# Patient Record
Sex: FEMALE | Race: WHITE | NOT HISPANIC OR LATINO | RURAL
[De-identification: names, ages, dates, MRNs, and addresses within clinical notes are randomized per-mention and may not be internally consistent; named-entity substitution may affect disease eponyms.]

---

## 2020-08-17 ENCOUNTER — HISTORICAL (OUTPATIENT)
Dept: ADMINISTRATIVE | Facility: HOSPITAL | Age: 35
End: 2020-08-17

## 2020-08-17 LAB — SARS-COV+SARS-COV-2 AG RESP QL IA.RAPID: POSITIVE

## 2022-12-04 ENCOUNTER — OFFICE VISIT (OUTPATIENT)
Dept: FAMILY MEDICINE | Facility: CLINIC | Age: 37
End: 2022-12-04
Payer: COMMERCIAL

## 2022-12-04 VITALS
BODY MASS INDEX: 35.99 KG/M2 | SYSTOLIC BLOOD PRESSURE: 136 MMHG | TEMPERATURE: 99 F | OXYGEN SATURATION: 95 % | DIASTOLIC BLOOD PRESSURE: 89 MMHG | WEIGHT: 216 LBS | HEART RATE: 110 BPM | HEIGHT: 65 IN

## 2022-12-04 DIAGNOSIS — J03.00 STREP TONSILLITIS: Primary | ICD-10-CM

## 2022-12-04 DIAGNOSIS — Z20.828 VIRAL DISEASE EXPOSURE: ICD-10-CM

## 2022-12-04 PROBLEM — J32.9 CHRONIC SINUSITIS: Status: ACTIVE | Noted: 2022-12-04

## 2022-12-04 PROBLEM — E11.9 TYPE 2 DIABETES MELLITUS WITHOUT COMPLICATION: Status: ACTIVE | Noted: 2022-12-04

## 2022-12-04 PROBLEM — E66.01 MORBIDLY OBESE: Status: ACTIVE | Noted: 2022-12-04

## 2022-12-04 PROBLEM — Z79.899 OTHER LONG TERM (CURRENT) DRUG THERAPY: Status: ACTIVE | Noted: 2022-12-04

## 2022-12-04 PROBLEM — E78.2 MIXED HYPERLIPIDEMIA: Status: ACTIVE | Noted: 2022-12-04

## 2022-12-04 LAB
CTP QC/QA: YES
FLUAV AG NPH QL: NEGATIVE
FLUBV AG NPH QL: NEGATIVE
S PYO RRNA THROAT QL PROBE: POSITIVE
SARS-COV-2 AG RESP QL IA.RAPID: NEGATIVE

## 2022-12-04 PROCEDURE — 3075F PR MOST RECENT SYSTOLIC BLOOD PRESS GE 130-139MM HG: ICD-10-PCS | Mod: ,,, | Performed by: NURSE PRACTITIONER

## 2022-12-04 PROCEDURE — 1160F RVW MEDS BY RX/DR IN RCRD: CPT | Mod: ,,, | Performed by: NURSE PRACTITIONER

## 2022-12-04 PROCEDURE — 99051 MED SERV EVE/WKEND/HOLIDAY: CPT | Mod: ,,, | Performed by: NURSE PRACTITIONER

## 2022-12-04 PROCEDURE — 3075F SYST BP GE 130 - 139MM HG: CPT | Mod: ,,, | Performed by: NURSE PRACTITIONER

## 2022-12-04 PROCEDURE — 99204 PR OFFICE/OUTPT VISIT, NEW, LEVL IV, 45-59 MIN: ICD-10-PCS | Mod: 25,,, | Performed by: NURSE PRACTITIONER

## 2022-12-04 PROCEDURE — 87804 INFLUENZA ASSAY W/OPTIC: CPT | Mod: QW,,, | Performed by: NURSE PRACTITIONER

## 2022-12-04 PROCEDURE — 3079F DIAST BP 80-89 MM HG: CPT | Mod: ,,, | Performed by: NURSE PRACTITIONER

## 2022-12-04 PROCEDURE — 3008F BODY MASS INDEX DOCD: CPT | Mod: ,,, | Performed by: NURSE PRACTITIONER

## 2022-12-04 PROCEDURE — 96372 THER/PROPH/DIAG INJ SC/IM: CPT | Mod: ,,, | Performed by: NURSE PRACTITIONER

## 2022-12-04 PROCEDURE — 1159F MED LIST DOCD IN RCRD: CPT | Mod: ,,, | Performed by: NURSE PRACTITIONER

## 2022-12-04 PROCEDURE — 3079F PR MOST RECENT DIASTOLIC BLOOD PRESSURE 80-89 MM HG: ICD-10-PCS | Mod: ,,, | Performed by: NURSE PRACTITIONER

## 2022-12-04 PROCEDURE — 87426 SARS CORONAVIRUS 2 ANTIGEN POCT: ICD-10-PCS | Mod: QW,,, | Performed by: NURSE PRACTITIONER

## 2022-12-04 PROCEDURE — 87804 POCT INFLUENZA A/B: ICD-10-PCS | Mod: 59,QW,, | Performed by: NURSE PRACTITIONER

## 2022-12-04 PROCEDURE — 87880 STREP A ASSAY W/OPTIC: CPT | Mod: QW,,, | Performed by: NURSE PRACTITIONER

## 2022-12-04 PROCEDURE — 87880 POCT RAPID STREP A: ICD-10-PCS | Mod: QW,,, | Performed by: NURSE PRACTITIONER

## 2022-12-04 PROCEDURE — 1159F PR MEDICATION LIST DOCUMENTED IN MEDICAL RECORD: ICD-10-PCS | Mod: ,,, | Performed by: NURSE PRACTITIONER

## 2022-12-04 PROCEDURE — 99051 PR MEDICAL SERVICES, EVE/WKEND/HOLIDAY: ICD-10-PCS | Mod: ,,, | Performed by: NURSE PRACTITIONER

## 2022-12-04 PROCEDURE — 1160F PR REVIEW ALL MEDS BY PRESCRIBER/CLIN PHARMACIST DOCUMENTED: ICD-10-PCS | Mod: ,,, | Performed by: NURSE PRACTITIONER

## 2022-12-04 PROCEDURE — 87426 SARSCOV CORONAVIRUS AG IA: CPT | Mod: QW,,, | Performed by: NURSE PRACTITIONER

## 2022-12-04 PROCEDURE — 99204 OFFICE O/P NEW MOD 45 MIN: CPT | Mod: 25,,, | Performed by: NURSE PRACTITIONER

## 2022-12-04 PROCEDURE — 96372 PR INJECTION,THERAP/PROPH/DIAG2ST, IM OR SUBCUT: ICD-10-PCS | Mod: ,,, | Performed by: NURSE PRACTITIONER

## 2022-12-04 PROCEDURE — 3008F PR BODY MASS INDEX (BMI) DOCUMENTED: ICD-10-PCS | Mod: ,,, | Performed by: NURSE PRACTITIONER

## 2022-12-04 RX ORDER — DEXAMETHASONE SODIUM PHOSPHATE 4 MG/ML
4 INJECTION, SOLUTION INTRA-ARTICULAR; INTRALESIONAL; INTRAMUSCULAR; INTRAVENOUS; SOFT TISSUE
Status: COMPLETED | OUTPATIENT
Start: 2022-12-04 | End: 2022-12-04

## 2022-12-04 RX ORDER — METFORMIN HYDROCHLORIDE 500 MG/1
500 TABLET ORAL 2 TIMES DAILY
COMMUNITY
Start: 2022-11-30

## 2022-12-04 RX ORDER — ROSUVASTATIN CALCIUM 10 MG/1
10 TABLET, COATED ORAL NIGHTLY
COMMUNITY
Start: 2022-09-21

## 2022-12-04 RX ORDER — AZITHROMYCIN 500 MG/1
500 TABLET, FILM COATED ORAL DAILY
Qty: 5 TABLET | Refills: 0 | Status: SHIPPED | OUTPATIENT
Start: 2022-12-04 | End: 2022-12-09

## 2022-12-04 RX ORDER — GLIMEPIRIDE 2 MG/1
2 TABLET ORAL 2 TIMES DAILY
COMMUNITY
Start: 2022-11-30

## 2022-12-04 RX ORDER — SEMAGLUTIDE 1.34 MG/ML
INJECTION, SOLUTION SUBCUTANEOUS
COMMUNITY
Start: 2022-06-30

## 2022-12-04 RX ORDER — CEFTRIAXONE 1 G/1
1 INJECTION, POWDER, FOR SOLUTION INTRAMUSCULAR; INTRAVENOUS
Status: COMPLETED | OUTPATIENT
Start: 2022-12-04 | End: 2022-12-04

## 2022-12-04 RX ADMIN — DEXAMETHASONE SODIUM PHOSPHATE 4 MG: 4 INJECTION, SOLUTION INTRA-ARTICULAR; INTRALESIONAL; INTRAMUSCULAR; INTRAVENOUS; SOFT TISSUE at 10:12

## 2022-12-04 RX ADMIN — CEFTRIAXONE 1 G: 1 INJECTION, POWDER, FOR SOLUTION INTRAMUSCULAR; INTRAVENOUS at 10:12

## 2022-12-04 NOTE — PROGRESS NOTES
"Subjective:       Patient ID: Betty Ortiz is a 37 y.o. female.    Chief Complaint: Sore Throat and Otalgia (Symptoms started 12/03/22. Pain in both ears)    Presents to clinic as above. No fever. She is a .    Review of Systems   Constitutional:  Positive for malaise/fatigue. Negative for chills and fever.   HENT:  Positive for ear pain and sore throat. Negative for ear discharge and sinus pain.    Respiratory: Negative.     Cardiovascular: Negative.    Musculoskeletal: Negative.    Neurological:  Positive for headaches.        Reviewed family, medical, surgical, and social history.    Objective:      /89   Pulse 110   Temp 99.2 °F (37.3 °C)   Ht 5' 5" (1.651 m)   Wt 98 kg (216 lb)   LMP 11/18/2022   SpO2 95%   BMI 35.94 kg/m²   Physical Exam  Vitals and nursing note reviewed.   Constitutional:       General: She is not in acute distress.     Appearance: Normal appearance. She is not ill-appearing, toxic-appearing or diaphoretic.   HENT:      Head: Normocephalic.      Right Ear: Tympanic membrane, ear canal and external ear normal.      Left Ear: Tympanic membrane, ear canal and external ear normal.      Nose: Nose normal. No congestion or rhinorrhea.      Mouth/Throat:      Mouth: Mucous membranes are moist.      Pharynx: Uvula midline. Pharyngeal swelling and posterior oropharyngeal erythema present. No oropharyngeal exudate or uvula swelling.      Tonsils: Tonsillar exudate present. No tonsillar abscesses. 3+ on the right. 3+ on the left.      Comments: Posterior pharynx bright red with petechiae  Cardiovascular:      Rate and Rhythm: Normal rate and regular rhythm.      Heart sounds: Normal heart sounds.   Pulmonary:      Effort: Pulmonary effort is normal.      Breath sounds: Normal breath sounds.   Musculoskeletal:      Cervical back: Normal range of motion and neck supple.   Skin:     General: Skin is warm and dry.      Capillary Refill: Capillary refill takes less than 2 seconds. "   Neurological:      Mental Status: She is alert and oriented to person, place, and time.   Psychiatric:         Mood and Affect: Mood normal.         Behavior: Behavior normal.         Thought Content: Thought content normal.         Judgment: Judgment normal.          Office Visit on 12/04/2022   Component Date Value Ref Range Status    Rapid Strep A Screen 12/04/2022 Positive (A)  Negative Final     Acceptable 12/04/2022 Yes   Final    Rapid Influenza A Ag 12/04/2022 Negative  Negative Final    Rapid Influenza B Ag 12/04/2022 Negative  Negative Final     Acceptable 12/04/2022 Yes   Final    SARS Coronavirus 2 Antigen 12/04/2022 Negative  Negative Final     Acceptable 12/04/2022 Yes   Final      Assessment:       1. Strep tonsillitis    2. Viral disease exposure          Plan:       Strep tonsillitis  -     cefTRIAXone injection 1 g  -     dexAMETHasone injection 4 mg  -     azithromycin (ZITHROMAX) 500 MG tablet; Take 1 tablet (500 mg total) by mouth once daily. for 5 days  Dispense: 5 tablet; Refill: 0    Viral disease exposure  -     POCT rapid strep A  -     POCT Influenza A/B  -     SARS Coronavirus 2 Antigen, POCT  Drink plenty of fluids  RTC PRN          Risks, benefits, and side effects were discussed with the patient. All questions were answered to the fullest satisfaction of the patient, and pt verbalized understanding and agreement to treatment plan. Pt was to call with any new or worsening symptoms, or present to the ER.

## 2024-07-11 ENCOUNTER — TELEPHONE (OUTPATIENT)
Dept: OBSTETRICS AND GYNECOLOGY | Facility: CLINIC | Age: 39
End: 2024-07-11
Payer: COMMERCIAL

## 2024-07-11 ENCOUNTER — OFFICE VISIT (OUTPATIENT)
Dept: OBSTETRICS AND GYNECOLOGY | Facility: CLINIC | Age: 39
End: 2024-07-11
Payer: COMMERCIAL

## 2024-07-11 VITALS
SYSTOLIC BLOOD PRESSURE: 124 MMHG | OXYGEN SATURATION: 99 % | RESPIRATION RATE: 17 BRPM | HEIGHT: 65 IN | DIASTOLIC BLOOD PRESSURE: 84 MMHG | BODY MASS INDEX: 35.65 KG/M2 | WEIGHT: 214 LBS | HEART RATE: 76 BPM

## 2024-07-11 DIAGNOSIS — Z86.39 HISTORY OF TYPE 2 DIABETES MELLITUS: ICD-10-CM

## 2024-07-11 DIAGNOSIS — Z34.90 EARLY STAGE OF PREGNANCY: Primary | ICD-10-CM

## 2024-07-11 DIAGNOSIS — Z32.01 POSITIVE URINE PREGNANCY TEST: ICD-10-CM

## 2024-07-11 DIAGNOSIS — Z34.91 FIRST TRIMESTER PREGNANCY: ICD-10-CM

## 2024-07-11 DIAGNOSIS — E66.9 OBESITY (BMI 30-39.9): ICD-10-CM

## 2024-07-11 DIAGNOSIS — N91.2 AMENORRHEA: Primary | ICD-10-CM

## 2024-07-11 DIAGNOSIS — O09.511 ADVANCED MATERNAL AGE, 1ST PREGNANCY, FIRST TRIMESTER: ICD-10-CM

## 2024-07-11 LAB
B-HCG UR QL: POSITIVE
CTP QC/QA: YES
HCG SERPL-ACNC: NORMAL MIU/ML

## 2024-07-11 PROCEDURE — 84702 CHORIONIC GONADOTROPIN TEST: CPT | Mod: ,,, | Performed by: CLINICAL MEDICAL LABORATORY

## 2024-07-11 PROCEDURE — 3008F BODY MASS INDEX DOCD: CPT | Mod: ,,, | Performed by: ADVANCED PRACTICE MIDWIFE

## 2024-07-11 PROCEDURE — 1159F MED LIST DOCD IN RCRD: CPT | Mod: ,,, | Performed by: ADVANCED PRACTICE MIDWIFE

## 2024-07-11 PROCEDURE — 81025 URINE PREGNANCY TEST: CPT | Mod: QW,,, | Performed by: ADVANCED PRACTICE MIDWIFE

## 2024-07-11 PROCEDURE — 99203 OFFICE O/P NEW LOW 30 MIN: CPT | Mod: ,,, | Performed by: ADVANCED PRACTICE MIDWIFE

## 2024-07-11 PROCEDURE — 3074F SYST BP LT 130 MM HG: CPT | Mod: ,,, | Performed by: ADVANCED PRACTICE MIDWIFE

## 2024-07-11 PROCEDURE — 3079F DIAST BP 80-89 MM HG: CPT | Mod: ,,, | Performed by: ADVANCED PRACTICE MIDWIFE

## 2024-07-11 RX ORDER — TOPIRAMATE 25 MG/1
25 TABLET ORAL
COMMUNITY
Start: 2024-03-11 | End: 2024-07-22

## 2024-07-11 NOTE — TELEPHONE ENCOUNTER
----- Message from Maria T Reyes LPN sent at 7/11/2024  4:50 PM CDT -----  Regarding: RE: new ob u/s order  done  ----- Message -----  From: Bettie Morris  Sent: 7/11/2024   4:12 PM CDT  To: Luis MENJIVAR Staff  Subject: new ob u/s order                                 Can  you please put in order for new ob u/s

## 2024-07-14 PROBLEM — E66.9 OBESITY (BMI 30-39.9): Status: ACTIVE | Noted: 2024-07-14

## 2024-07-14 PROBLEM — O09.511 ADVANCED MATERNAL AGE, 1ST PREGNANCY, FIRST TRIMESTER: Status: ACTIVE | Noted: 2024-07-14

## 2024-07-14 PROBLEM — Z86.39 HISTORY OF TYPE 2 DIABETES MELLITUS: Status: ACTIVE | Noted: 2024-07-14

## 2024-07-14 PROBLEM — Z32.01 POSITIVE URINE PREGNANCY TEST: Status: ACTIVE | Noted: 2024-07-14

## 2024-07-14 PROBLEM — Z34.91 FIRST TRIMESTER PREGNANCY: Status: ACTIVE | Noted: 2024-07-14

## 2024-07-14 PROBLEM — N91.2 AMENORRHEA: Status: ACTIVE | Noted: 2024-07-14

## 2024-07-15 NOTE — PROGRESS NOTES
"Patient ID:  Betty Ortiz is a 38 y.o. female      Chief Complaint:   Chief Complaint   Patient presents with    confirm pregnancy     Patient is here to confirm pregnancy        HPI:  Betty presents as a new patient for pregnancy confirmation. Has had positive home UPT. Unplanned pregnancy. Reports hx irregular cycles in past, was told years ago that she would not be able to conceive. Type 2 DM, states HgbA1C has been 5.0 last checks. On Ozempic x 2 years with weight loss. No contraceptive use.   LMP: Patient's last menstrual period was 2024.   Sexually active:  yes  Contraceptive: none      Past Medical History:   Diagnosis Date    Diabetes mellitus, type 2      History reviewed. No pertinent surgical history.    OB History          1    Para        Term                AB        Living             SAB        IAB        Ectopic        Multiple        Live Births                     /84 (BP Location: Left arm, Patient Position: Sitting)   Pulse 76   Resp 17   Ht 5' 5" (1.651 m)   Wt 97.1 kg (214 lb)   LMP 2024   SpO2 99%   BMI 35.61 kg/m²   Wt Readings from Last 3 Encounters:   24 97.1 kg (214 lb)   22 98 kg (216 lb)          ROS:  Review of Systems   Constitutional: Negative.    Eyes: Negative.    Respiratory: Negative.     Cardiovascular: Negative.    Gastrointestinal: Negative.    Genitourinary: Negative.    Musculoskeletal: Negative.    Neurological: Negative.    Psychiatric/Behavioral: Negative.            PHYSICAL EXAM:  Physical Exam  Constitutional:       Appearance: Normal appearance. She is obese.   Eyes:      Extraocular Movements: Extraocular movements intact.   Cardiovascular:      Rate and Rhythm: Normal rate.      Pulses: Normal pulses.   Pulmonary:      Effort: Pulmonary effort is normal.   Musculoskeletal:         General: Normal range of motion.      Cervical back: Normal range of motion.   Skin:     General: Skin is warm and dry.   Neurological: "      Mental Status: She is alert and oriented to person, place, and time.   Psychiatric:         Mood and Affect: Mood normal.         Behavior: Behavior normal.         Thought Content: Thought content normal.         Judgment: Judgment normal.          Assessment:  Betty was seen today for confirm pregnancy.    Diagnoses and all orders for this visit:    Amenorrhea  -     POCT urine pregnancy  -     Ambulatory referral/consult to Obstetrics / Gynecology; Future    Positive urine pregnancy test  -     hCG, Total, Quantitative; Future  -     Ambulatory referral/consult to Obstetrics / Gynecology; Future  -     PNV,calcium 72-iron-folic acid (PRENATAL VITAMIN PLUS LOW IRON) 27 mg iron- 1 mg Tab; Take 1 tablet (1 each total) by mouth once daily.  -     hCG, Total, Quantitative    First trimester pregnancy  -     Ambulatory referral/consult to Obstetrics / Gynecology; Future    Advanced maternal age, 1st pregnancy, first trimester  -     Ambulatory referral/consult to Obstetrics / Gynecology; Future    History of type 2 diabetes mellitus    Obesity (BMI 30-39.9)          ICD-10-CM ICD-9-CM    1. Amenorrhea  N91.2 626.0 POCT urine pregnancy      Ambulatory referral/consult to Obstetrics / Gynecology      2. Positive urine pregnancy test  Z32.01 V72.42 hCG, Total, Quantitative      Ambulatory referral/consult to Obstetrics / Gynecology      PNV,calcium 72-iron-folic acid (PRENATAL VITAMIN PLUS LOW IRON) 27 mg iron- 1 mg Tab      hCG, Total, Quantitative      3. First trimester pregnancy  Z34.91 V22.2 Ambulatory referral/consult to Obstetrics / Gynecology      4. Advanced maternal age, 1st pregnancy, first trimester  O09.511 659.53 Ambulatory referral/consult to Obstetrics / Gynecology      5. History of type 2 diabetes mellitus  Z86.39 V12.29       6. Obesity (BMI 30-39.9)  E66.9 278.00           Plan:  UPT positive, hcg level ordered  Will call or send My Chart message  Discussed EDC as 3/3/25 by LMP, approximately  6w63  Discussed local OB/Gyn providers, desires to deliver at [facility]  Referral order placed with , appointment scheduled  Continue daily prenatal vitamin, order placed as desires rx  Reviewed healthy pregnancy info including foods to avoid/limit, expected weight gain, printed info given  Continue normal activities unless pain or bleeding occurs    Follow up for new OB care with .

## 2024-07-22 ENCOUNTER — INITIAL PRENATAL (OUTPATIENT)
Dept: OBSTETRICS AND GYNECOLOGY | Facility: CLINIC | Age: 39
End: 2024-07-22
Payer: COMMERCIAL

## 2024-07-22 ENCOUNTER — HOSPITAL ENCOUNTER (OUTPATIENT)
Dept: RADIOLOGY | Facility: HOSPITAL | Age: 39
Discharge: HOME | End: 2024-07-22
Attending: OBSTETRICS & GYNECOLOGY
Payer: COMMERCIAL

## 2024-07-22 VITALS
WEIGHT: 218.5 LBS | HEART RATE: 65 BPM | DIASTOLIC BLOOD PRESSURE: 80 MMHG | BODY MASS INDEX: 36.36 KG/M2 | SYSTOLIC BLOOD PRESSURE: 128 MMHG

## 2024-07-22 DIAGNOSIS — Z32.01 POSITIVE URINE PREGNANCY TEST: ICD-10-CM

## 2024-07-22 DIAGNOSIS — Z11.3 SCREENING EXAMINATION FOR VENEREAL DISEASE: ICD-10-CM

## 2024-07-22 DIAGNOSIS — Z72.51 HIGH RISK HETEROSEXUAL BEHAVIOR: ICD-10-CM

## 2024-07-22 DIAGNOSIS — Z12.4 ENCOUNTER FOR SCREENING FOR CERVICAL CANCER: ICD-10-CM

## 2024-07-22 DIAGNOSIS — Z34.90 EARLY STAGE OF PREGNANCY: Primary | ICD-10-CM

## 2024-07-22 DIAGNOSIS — O24.111 PREGNANCY WITH TYPE 2 DIABETES MELLITUS IN FIRST TRIMESTER: ICD-10-CM

## 2024-07-22 DIAGNOSIS — O99.210 OBESITY AFFECTING PREGNANCY, ANTEPARTUM, UNSPECIFIED OBESITY TYPE: ICD-10-CM

## 2024-07-22 DIAGNOSIS — O09.511 ADVANCED MATERNAL AGE, 1ST PREGNANCY, FIRST TRIMESTER: ICD-10-CM

## 2024-07-22 DIAGNOSIS — Z34.91 FIRST TRIMESTER PREGNANCY: ICD-10-CM

## 2024-07-22 DIAGNOSIS — N91.2 AMENORRHEA: ICD-10-CM

## 2024-07-22 DIAGNOSIS — Z34.01 ENCOUNTER FOR SUPERVISION OF NORMAL FIRST PREGNANCY IN FIRST TRIMESTER: ICD-10-CM

## 2024-07-22 DIAGNOSIS — Z34.90 EARLY STAGE OF PREGNANCY: ICD-10-CM

## 2024-07-22 PROCEDURE — 87491 CHLMYD TRACH DNA AMP PROBE: CPT | Mod: ,,, | Performed by: CLINICAL MEDICAL LABORATORY

## 2024-07-22 PROCEDURE — 80358 DRUG SCREENING METHADONE: CPT | Mod: ,,, | Performed by: CLINICAL MEDICAL LABORATORY

## 2024-07-22 PROCEDURE — 80346 BENZODIAZEPINES1-12: CPT | Mod: ,,, | Performed by: CLINICAL MEDICAL LABORATORY

## 2024-07-22 PROCEDURE — 76801 OB US < 14 WKS SINGLE FETUS: CPT | Mod: TC

## 2024-07-22 PROCEDURE — 99999 PR PBB SHADOW E&M-EST. PATIENT-LVL IV: CPT | Mod: PBBFAC,,, | Performed by: OBSTETRICS & GYNECOLOGY

## 2024-07-22 PROCEDURE — 80369 SKELETAL MUSCLE RELAXANT 1/2: CPT | Mod: ,,, | Performed by: CLINICAL MEDICAL LABORATORY

## 2024-07-22 PROCEDURE — 87086 URINE CULTURE/COLONY COUNT: CPT | Mod: ,,, | Performed by: CLINICAL MEDICAL LABORATORY

## 2024-07-22 PROCEDURE — 80353 DRUG SCREENING COCAINE: CPT | Mod: ,,, | Performed by: CLINICAL MEDICAL LABORATORY

## 2024-07-22 PROCEDURE — 87591 N.GONORRHOEAE DNA AMP PROB: CPT | Mod: ,,, | Performed by: CLINICAL MEDICAL LABORATORY

## 2024-07-22 PROCEDURE — 80324 DRUG SCREEN AMPHETAMINES 1/2: CPT | Mod: ,,, | Performed by: CLINICAL MEDICAL LABORATORY

## 2024-07-22 PROCEDURE — 80361 OPIATES 1 OR MORE: CPT | Mod: ,,, | Performed by: CLINICAL MEDICAL LABORATORY

## 2024-07-22 PROCEDURE — 80365 DRUG SCREENING OXYCODONE: CPT | Mod: ,,, | Performed by: CLINICAL MEDICAL LABORATORY

## 2024-07-22 PROCEDURE — 80349 CANNABINOIDS NATURAL: CPT | Mod: ,,, | Performed by: CLINICAL MEDICAL LABORATORY

## 2024-07-22 PROCEDURE — 80335 ANTIDEPRESSANT TRICYCLIC 1/2: CPT | Mod: ,,, | Performed by: CLINICAL MEDICAL LABORATORY

## 2024-07-22 PROCEDURE — 87661 TRICHOMONAS VAGINALIS AMPLIF: CPT | Mod: ,,, | Performed by: CLINICAL MEDICAL LABORATORY

## 2024-07-22 PROCEDURE — 0500F INITIAL PRENATAL CARE VISIT: CPT | Mod: S$GLB,,, | Performed by: OBSTETRICS & GYNECOLOGY

## 2024-07-22 RX ORDER — VALACYCLOVIR HYDROCHLORIDE 1 G/1
500 TABLET, FILM COATED ORAL DAILY
COMMUNITY
Start: 2024-03-25

## 2024-07-22 NOTE — PROGRESS NOTES
Subjective:      Betty Ortiz is being seen today for her first obstetrical visit.  This is not a planned pregnancy. She is at  8w 0d gestation. Her obstetrical history is significant for advanced maternal age. Relationship with FOB:  ? . Patient  unsure if she  intends to breast feed. Pregnancy history fully reviewed.  Denies vaginal bleeding, abd pain or cramping.    She is feeling well today. Denies any pregnancy symptoms.  She does have a h/o Type 2 DM. This has been well controlled on Metformin and Glymepiride. Last HA1C was 5.0 in 2024 per patient.     She is on Crestor for hyperlipidemia. Counseled to d/c this.     She lost about 100 lbs over the last few years. She was on Ozempic, but discontinued this and Topamax when she found out she was pregnant.   She is taking a PNV daily.    She was diagnosed with PCOS about 15 years ago and told that she could never get pregnant. However, after she lost the 100 lbs, then her menses regulated to monthly. She was not expecting this, but happily excited.     She has not had a PAP in > 5 years.    Menstrual History:  OB History          1    Para        Term                AB        Living             SAB        IAB        Ectopic        Multiple        Live Births                      Patient's last menstrual period was 2024.  EDC by LMP 3/4/2025**  US today +IUP @ 7+4 wks +FHTs 160 bpm AAYUSH 3/6/2025.          The following portions of the patient's history were reviewed and updated as appropriate: allergies, current medications, past family history, past medical history, past social history, past surgical history, and problem list.    Review of Systems  Pertinent items are noted in HPI.      Objective:      /80   Pulse 65   Wt 99.1 kg (218 lb 8 oz)   LMP 2024   BMI 36.36 kg/m²   General appearance: alert, appears stated age, cooperative, and no distress. Obese.  Head: Normocephalic, without obvious abnormality,  atraumatic  Lungs: clear to auscultation bilaterally and even/unlabored  Heart: regular rate and rhythm  Abdomen: soft, non-tender  Extremities: extremities normal, atraumatic, no cyanosis or edema  Skin: Skin color, texture, turgor normal. No rashes or lesions      Assessment:     Secondary Amenorrhea   Pregnancy at 8 and 0/7 weeks      Type 2 DM in pregnancy, 1st trimester  AMA  Maternal obesity in pregnancy    Plan:      Initial labs drawn.  Lorna Screen discussed. She desires this.  Prenatal vitamins.  Problem list reviewed and updated.  New OB booklet given to pt to review.  Reviewed healthy diet, exercise during pregnancy and weight gain recommendations.  Safe OTC med list given and reviewed.  Discussed danger s/s to report including bleeding precautions.    Follow up in 3 weeks. Plan PAP and NIPT at that visit. Also discuss ASA 81 mg daily and breast feeding at that visit.    **Stop Crestor and Glymepiridie.   **BG fasting and 2h PP. Send BG weekly through the portal.  **Refer to MFM due to AMA and Type 2 DM.    Eugenia Smith MD

## 2024-07-22 NOTE — PATIENT INSTRUCTIONS
Check blood glucose before eating and 2 hours after eating. Stop cholesterol meds and attempt to stop glimepiride; watch sugars accordingly.

## 2024-07-23 LAB
CHLAMYDIA BY PCR: NEGATIVE
N. GONORRHOEAE (GC) BY PCR: NEGATIVE
TRICHOMONAS NAT: NEGATIVE

## 2024-07-24 LAB — UA COMPLETE W REFLEX CULTURE PNL UR: NORMAL

## 2024-07-26 LAB
7-AMINOCLONAZEPAM, URINE (RUSH): NEGATIVE 25 NG/ML
A-HYDROXYALPRAZOLAM, URINE (RUSH): NEGATIVE 25 NG/ML
AMITRIPTYLINE, URINE (RUSH): NEGATIVE 25 NG/ML
BENZOYLECGONINE, URINE (RUSH): NEGATIVE 100 NG/ML
CARISOPRODOL, URINE (RUSH): NEGATIVE 100 NG/ML
CODEINE, URINE (RUSH): NEGATIVE 25 NG/ML
CREAT UR-MCNC: 17 MG/DL (ref 28–219)
EDDP, URINE (RUSH): NEGATIVE 25 NG/ML
HYDROCODONE, URINE (RUSH): NEGATIVE 25 NG/ML
HYDROMORPHONE, URINE (RUSH): NEGATIVE 25 NG/ML
MEPROBAMATE, URINE (RUSH): NEGATIVE 100 NG/ML
METHADONE UR QL SCN: NEGATIVE 25 NG/ML
METHAMPHET UR QL SCN: NEGATIVE
MORPHINE, URINE (RUSH): NEGATIVE 25 NG/ML
NORDIAZEPAM, URINE (RUSH): NEGATIVE 25 NG/ML
NORHYDROCODONE, URINE (RUSH): NEGATIVE 50 NG/ML
NOROXYCODONE HCL, URINE (RUSH): NEGATIVE 50 NG/ML
OXYCODONE UR QL SCN: NEGATIVE 25 NG/ML
OXYMORPHONE, URINE (RUSH): NEGATIVE 25 NG/ML
PH UR STRIP: 5.5 PH UNITS
SP GR UR STRIP: 1
TEMAZEPAM, URINE (RUSH): NEGATIVE 25 NG/ML
THC-COOH, URINE (RUSH): NEGATIVE 25 NG/ML

## 2024-07-29 ENCOUNTER — PATIENT MESSAGE (OUTPATIENT)
Dept: OBSTETRICS AND GYNECOLOGY | Facility: CLINIC | Age: 39
End: 2024-07-29
Payer: COMMERCIAL

## 2024-08-01 ENCOUNTER — PATIENT MESSAGE (OUTPATIENT)
Dept: OBSTETRICS AND GYNECOLOGY | Facility: CLINIC | Age: 39
End: 2024-08-01
Payer: COMMERCIAL

## 2024-08-01 DIAGNOSIS — O24.111 PREGNANCY WITH TYPE 2 DIABETES MELLITUS IN FIRST TRIMESTER: Primary | ICD-10-CM

## 2024-08-01 NOTE — TELEPHONE ENCOUNTER
Please call her and tell her to take Metformin in the morning and at night. Also, meet with the nutrition specialist and then send me the BG next week as well. We may need to add insulin, but we can see how she does on the Metformin first.

## 2024-08-05 NOTE — TELEPHONE ENCOUNTER
I would say just the [facility] nutritionist should be fine. She doesn't need to see Orz here. Just send Roz a message that she was seen at [facility] nutrition.

## 2024-08-12 ENCOUNTER — HOSPITAL ENCOUNTER (OUTPATIENT)
Dept: RADIOLOGY | Facility: HOSPITAL | Age: 39
Discharge: HOME | End: 2024-08-12
Attending: OBSTETRICS & GYNECOLOGY
Payer: COMMERCIAL

## 2024-08-12 ENCOUNTER — PATIENT MESSAGE (OUTPATIENT)
Dept: OBSTETRICS AND GYNECOLOGY | Facility: CLINIC | Age: 39
End: 2024-08-12
Payer: COMMERCIAL

## 2024-08-12 ENCOUNTER — ROUTINE PRENATAL (OUTPATIENT)
Dept: OBSTETRICS AND GYNECOLOGY | Facility: CLINIC | Age: 39
End: 2024-08-12
Attending: OBSTETRICS & GYNECOLOGY
Payer: COMMERCIAL

## 2024-08-12 VITALS
DIASTOLIC BLOOD PRESSURE: 75 MMHG | BODY MASS INDEX: 36.94 KG/M2 | HEART RATE: 69 BPM | SYSTOLIC BLOOD PRESSURE: 120 MMHG | WEIGHT: 222 LBS

## 2024-08-12 DIAGNOSIS — Z34.91 FIRST TRIMESTER PREGNANCY: ICD-10-CM

## 2024-08-12 DIAGNOSIS — O09.511 ADVANCED MATERNAL AGE, 1ST PREGNANCY, FIRST TRIMESTER: ICD-10-CM

## 2024-08-12 DIAGNOSIS — O99.210 OTHER OBESITY AFFECTING PREGNANCY, ANTEPARTUM: ICD-10-CM

## 2024-08-12 DIAGNOSIS — Z3A.11 11 WEEKS GESTATION OF PREGNANCY: ICD-10-CM

## 2024-08-12 DIAGNOSIS — Z34.90 EARLY STAGE OF PREGNANCY: ICD-10-CM

## 2024-08-12 DIAGNOSIS — O24.111 PREGNANCY WITH TYPE 2 DIABETES MELLITUS IN FIRST TRIMESTER: ICD-10-CM

## 2024-08-12 DIAGNOSIS — E66.8 OTHER OBESITY AFFECTING PREGNANCY, ANTEPARTUM: ICD-10-CM

## 2024-08-12 DIAGNOSIS — Z12.4 SCREENING FOR MALIGNANT NEOPLASM OF THE CERVIX: Primary | ICD-10-CM

## 2024-08-12 LAB
BILIRUB SERPL-MCNC: NORMAL MG/DL
BLOOD URINE, POC: NORMAL
CLARITY, POC UA: NORMAL
COLOR, POC UA: NORMAL
GLUCOSE UR QL STRIP: NORMAL
KETONES UR QL STRIP: NORMAL
LEUKOCYTE ESTERASE URINE, POC: NORMAL
NITRITE, POC UA: NORMAL
PH, POC UA: 6
PROTEIN, POC: NORMAL
SPECIFIC GRAVITY, POC UA: 1.01
UROBILINOGEN, POC UA: 0.2

## 2024-08-12 PROCEDURE — 76816 OB US FOLLOW-UP PER FETUS: CPT | Mod: TC

## 2024-08-12 PROCEDURE — 0502F SUBSEQUENT PRENATAL CARE: CPT | Mod: S$GLB,,, | Performed by: OBSTETRICS & GYNECOLOGY

## 2024-08-12 PROCEDURE — 88142 CYTOPATH C/V THIN LAYER: CPT | Mod: TC,GCY | Performed by: OBSTETRICS & GYNECOLOGY

## 2024-08-12 PROCEDURE — 99999 PR PBB SHADOW E&M-EST. PATIENT-LVL III: CPT | Mod: PBBFAC,,, | Performed by: OBSTETRICS & GYNECOLOGY

## 2024-08-12 PROCEDURE — 87624 HPV HI-RISK TYP POOLED RSLT: CPT | Mod: ,,, | Performed by: CLINICAL MEDICAL LABORATORY

## 2024-08-12 PROCEDURE — 76816 OB US FOLLOW-UP PER FETUS: CPT | Mod: 26,,, | Performed by: RADIOLOGY

## 2024-08-12 RX ORDER — INSULIN GLARGINE 100 [IU]/ML
40 INJECTION, SOLUTION SUBCUTANEOUS NIGHTLY
COMMUNITY
Start: 2024-07-30

## 2024-08-12 RX ORDER — ASPIRIN 81 MG/1
81 TABLET ORAL DAILY
COMMUNITY
Start: 2024-07-30

## 2024-08-12 RX ORDER — PEN NEEDLE, DIABETIC 30 GX3/16"
NEEDLE, DISPOSABLE MISCELLANEOUS
COMMUNITY
Start: 2024-07-30

## 2024-08-12 NOTE — PROGRESS NOTES
Return OB Visit    39 y.o. female  at 11w0d   She c/o nothing. She is doing well today. She is being followed by [facility] and here. They added Metformin 500 mg po BID and Lantus 40 mg SQ qhs.  US today +FHTs 165 bpm.  Reports no fetal movement or fluttering. Denies any vaginal bleeding, leakage of fluid, cramping, contractions, or pressure.   Total weight gain/weight loss in pregnancy: 1.361 kg (3 lb)     Vitals  BP: 120/75  Pulse: 69  Weight: 100.7 kg (222 lb)  Prenatal  Fetal Heart Rate: 165  Edema  LLE Edema: None  RLE Edema: None  Facial: None  Additional Edema?: No    SSE: Vaginal vault wnl. No blood in vault. Physiologic discharge. Cervix without lesions and closed. PAP obtained.    Prenatal Labs:  Lab Results   Component Value Date    GROUPTRH A POS 2024    HGB 14.4 2024    HCT 43.1 2024     2024    SICKLE Negative 2024    HEPBSAG Non-Reactive 2024    [id number] Non-Reactive 2024    LABNGO Negative 2024    LABURIN Skin/Urogenital Abril Isolated, no further workup. 2024       A: 11w0d           ICD-10-CM ICD-9-CM    1. Screening for malignant neoplasm of the cervix  Z12.4 V76.2 ThinPrep Pap Test      2. 11 weeks gestation of pregnancy  Z3A.11 V22.2 POCT URINALYSIS W/O SCOPE      3. Pregnancy with type 2 diabetes mellitus in first trimester  O24.111 648.03      250.00       4. Advanced maternal age, 1st pregnancy, first trimester  O09.511 659.53       5. Other obesity affecting pregnancy, antepartum  O99.210 649.13     E66.8 278.00           No pregnancy checklist tasks were completed during this visit, and no tasks are pending completion.    -Benefits of breastfeeding   -Rooming In and Skin to Skin    P: Bleeding, daily fetal kick counts, and  labor/labor precautions discussed.    Questions answered to desired level of satisfaction  Verbalized understanding to all information and instructions provided.  Follow up in about 4 weeks (around  9/9/2024) for JOJO visit.    PAP performed.   Continue Metformin and Lantus as prescribed.  F/u with MFM as scheduled on 8/26.  Start ASA 81 mg daily next week.  NIPT today.    Eugenia Smith MD

## 2024-08-14 LAB
GH SERPL-MCNC: NORMAL NG/ML
INSULIN SERPL-ACNC: NORMAL U[IU]/ML
LAB AP CLINICAL INFORMATION: NORMAL
LAB AP GYN INTERPRETATION: NEGATIVE
LAB AP PAP DISCLAIMER COMMENTS: NORMAL
RENIN PLAS-CCNC: NORMAL NG/ML/H

## 2024-08-17 LAB
HPV 16: NEGATIVE
HPV 18: NEGATIVE
HPV OTHER: NEGATIVE

## 2024-09-09 ENCOUNTER — ROUTINE PRENATAL (OUTPATIENT)
Dept: OBSTETRICS AND GYNECOLOGY | Facility: CLINIC | Age: 39
End: 2024-09-09
Payer: COMMERCIAL

## 2024-09-09 ENCOUNTER — PATIENT MESSAGE (OUTPATIENT)
Dept: OBSTETRICS AND GYNECOLOGY | Facility: CLINIC | Age: 39
End: 2024-09-09
Payer: COMMERCIAL

## 2024-09-09 VITALS
BODY MASS INDEX: 37.77 KG/M2 | SYSTOLIC BLOOD PRESSURE: 123 MMHG | WEIGHT: 227 LBS | HEART RATE: 77 BPM | DIASTOLIC BLOOD PRESSURE: 73 MMHG

## 2024-09-09 DIAGNOSIS — O09.522 MULTIGRAVIDA OF ADVANCED MATERNAL AGE IN SECOND TRIMESTER: ICD-10-CM

## 2024-09-09 DIAGNOSIS — E66.89 OTHER OBESITY AFFECTING PREGNANCY IN SECOND TRIMESTER: ICD-10-CM

## 2024-09-09 DIAGNOSIS — O99.212 OTHER OBESITY AFFECTING PREGNANCY IN SECOND TRIMESTER: ICD-10-CM

## 2024-09-09 DIAGNOSIS — O24.111 PREGNANCY WITH TYPE 2 DIABETES MELLITUS IN FIRST TRIMESTER: ICD-10-CM

## 2024-09-09 DIAGNOSIS — Z3A.15 15 WEEKS GESTATION OF PREGNANCY: Primary | ICD-10-CM

## 2024-09-09 DIAGNOSIS — O24.112 PREGNANCY WITH TYPE 2 DIABETES MELLITUS IN SECOND TRIMESTER: ICD-10-CM

## 2024-09-09 LAB
BILIRUB SERPL-MCNC: NORMAL MG/DL
BLOOD URINE, POC: NORMAL
CLARITY, UA: NORMAL
COLOR, UA: NORMAL
GLUCOSE UR QL STRIP: NORMAL
KETONES UR QL STRIP: NORMAL
LEUKOCYTE ESTERASE URINE, POC: NORMAL
NITRITE, POC UA: NORMAL
PH, POC UA: 5
PROTEIN, POC: NORMAL
SPECIFIC GRAVITY, POC UA: 1.03
UROBILINOGEN, POC UA: 0.2

## 2024-09-09 PROCEDURE — 99999 PR PBB SHADOW E&M-EST. PATIENT-LVL III: CPT | Mod: PBBFAC,,, | Performed by: OBSTETRICS & GYNECOLOGY

## 2024-09-09 PROCEDURE — 0502F SUBSEQUENT PRENATAL CARE: CPT | Mod: S$GLB,,, | Performed by: OBSTETRICS & GYNECOLOGY

## 2024-09-09 NOTE — PROGRESS NOTES
Return OB Visit    39 y.o. female  at 15w0d   She c/o nothing at this time. She has been seeing MFM. BG is wnl. She is sending BG weekly to myself and [facility]. Continue current insulin regimen.  [facility] wants her to have a baseline EKG and maternal echo. Will refer to Dr. Clayton.  She is taking the ASA 81 mg daily.   Reports good fetal movement or fluttering. Denies any vaginal bleeding, leakage of fluid, cramping, contractions, or pressure.   Total weight gain/weight loss in pregnancy: 3.629 kg (8 lb)     Vitals  BP: 123/73  Pulse: 77  Weight: 103 kg (227 lb)  Prenatal  Fetal Heart Rate: 145  Movement: Present  Edema  LLE Edema: None  RLE Edema: None  Facial: None  Additional Edema?: No    Prenatal Labs:  Lab Results   Component Value Date    GROUPTRH A POS 2024    HGB 14.4 2024    HCT 43.1 2024     2024    SICKLE Negative 2024    HEPBSAG Non-Reactive 2024    [id number] Non-Reactive 2024    LABNGO Negative 2024    LABURIN Skin/Urogenital Abril Isolated, no further workup. 2024       A: 15w0d           ICD-10-CM ICD-9-CM    1. 15 weeks gestation of pregnancy  Z3A.15 V22.2 POCT URINALYSIS W/O SCOPE      2. Pregnancy with type 2 diabetes mellitus in first trimester  O24.111 648.03 Ambulatory referral/consult to Cardiology     250.00       3. Pregnancy with type 2 diabetes mellitus in second trimester  O24.112 648.03      250.00       4. Other obesity affecting pregnancy in second trimester  O99.212 649.13     E66.8 278.00       5. Multigravida of advanced maternal age in second trimester  O09.522 659.63           No pregnancy checklist tasks were completed during this visit, and no tasks are pending completion.    -Benefits of breastfeeding   -Rooming In and Skin to Skin    P: Bleeding, daily fetal kick counts, and  labor/labor precautions discussed.    Questions answered to desired level of satisfaction  Verbalized understanding to all information and  instructions provided.  Follow up in about 4 weeks (around 10/7/2024) for JOSÉ Smith MD

## 2024-09-16 ENCOUNTER — OFFICE VISIT (OUTPATIENT)
Dept: CARDIOLOGY | Facility: CLINIC | Age: 39
End: 2024-09-16
Payer: COMMERCIAL

## 2024-09-16 VITALS
BODY MASS INDEX: 38.15 KG/M2 | HEART RATE: 78 BPM | OXYGEN SATURATION: 96 % | WEIGHT: 229 LBS | SYSTOLIC BLOOD PRESSURE: 130 MMHG | DIASTOLIC BLOOD PRESSURE: 100 MMHG | HEIGHT: 65 IN

## 2024-09-16 DIAGNOSIS — R06.09 DYSPNEA ON EXERTION: Primary | ICD-10-CM

## 2024-09-16 DIAGNOSIS — O24.111 PREGNANCY WITH TYPE 2 DIABETES MELLITUS IN FIRST TRIMESTER: ICD-10-CM

## 2024-09-16 DIAGNOSIS — R03.0 ELEVATED BLOOD PRESSURE READING IN OFFICE WITHOUT DIAGNOSIS OF HYPERTENSION: ICD-10-CM

## 2024-09-16 DIAGNOSIS — E11.9 TYPE 2 DIABETES MELLITUS WITHOUT COMPLICATION, UNSPECIFIED WHETHER LONG TERM INSULIN USE: ICD-10-CM

## 2024-09-16 PROCEDURE — 93000 ELECTROCARDIOGRAM COMPLETE: CPT | Mod: S$GLB,,, | Performed by: STUDENT IN AN ORGANIZED HEALTH CARE EDUCATION/TRAINING PROGRAM

## 2024-09-16 PROCEDURE — 99204 OFFICE O/P NEW MOD 45 MIN: CPT | Mod: S$GLB,,, | Performed by: STUDENT IN AN ORGANIZED HEALTH CARE EDUCATION/TRAINING PROGRAM

## 2024-09-16 PROCEDURE — 99999 PR PBB SHADOW E&M-EST. PATIENT-LVL V: CPT | Mod: PBBFAC,,, | Performed by: STUDENT IN AN ORGANIZED HEALTH CARE EDUCATION/TRAINING PROGRAM

## 2024-09-16 PROCEDURE — 3075F SYST BP GE 130 - 139MM HG: CPT | Mod: S$GLB,,, | Performed by: STUDENT IN AN ORGANIZED HEALTH CARE EDUCATION/TRAINING PROGRAM

## 2024-09-16 PROCEDURE — 1159F MED LIST DOCD IN RCRD: CPT | Mod: S$GLB,,, | Performed by: STUDENT IN AN ORGANIZED HEALTH CARE EDUCATION/TRAINING PROGRAM

## 2024-09-16 PROCEDURE — 3008F BODY MASS INDEX DOCD: CPT | Mod: S$GLB,,, | Performed by: STUDENT IN AN ORGANIZED HEALTH CARE EDUCATION/TRAINING PROGRAM

## 2024-09-16 PROCEDURE — 3080F DIAST BP >= 90 MM HG: CPT | Mod: S$GLB,,, | Performed by: STUDENT IN AN ORGANIZED HEALTH CARE EDUCATION/TRAINING PROGRAM

## 2024-09-16 NOTE — PROGRESS NOTES
"PCP: No, Primary Doctor    Referring Provider:  Eugenia Smith MD  [address] [place] [address]    Reason for referral:  Pregnancy with type 2 diabetes in 1st trimester    Subjective:   Betty Ortiz is a 39 y.o. female with hx of diabetes who is currently in her 1st trimester of pregnancy presents for new patient visit.    Given patient's age and history of type 2 diabetes [facility] recommends baseline EKG and echo.  EKG today shows normal sinus rhythm and poor R-wave progression across precordial leads.  She is largely asymptomatic from cardiac standpoint.  Endorses shortness of breath with a very heavy exertion.  Blood pressure is elevated today, however, patient notes that she never had high blood pressure and that she had some stress at work and she attributes her elevated blood pressure reading to that.  On recheck it went from 140/100 to 130/100.  She notes she has not [facility] appointment next week and if her blood pressure is elevated she will contact our office.    Fhx:  Father (diabetes)  Shx:  Never smoker    EKG 09/16/2024: Sinus rhythm, can not rule out anterior infarct, age undetermined, low-voltage QRS precordial leads    ECHO No results found for this or any previous visit.     CATH: No results found for this or any previous visit.     Lab Results   Component Value Date     07/22/2024    K 4.3 07/22/2024     07/22/2024    CO2 26 07/22/2024    BUN 9 07/22/2024    CREATININE 0.61 07/22/2024    CALCIUM 8.9 07/22/2024    ANIONGAP 9 07/22/2024       No results found for: "CHOL"  No results found for: "HDL"  No results found for: "LDLCALC"  No results found for: "TRIG"  No results found for: "CHOLHDL"    Lab Results   Component Value Date    WBC 10.15 07/22/2024    HGB 14.4 07/22/2024    HCT 43.1 07/22/2024    MCV 90.0 07/22/2024     07/22/2024           Current Outpatient Medications:     aspirin (ECOTRIN) 81 MG EC tablet, Take 81 mg by mouth once daily., Disp: , Rfl:     insulin glargine " "U-100, Lantus, 100 unit/mL (3 mL) SubQ InPn pen, Inject 40 Units into the skin every evening., Disp: , Rfl:     metFORMIN (GLUCOPHAGE) 500 MG tablet, Take 500 mg by mouth 2 (two) times daily., Disp: , Rfl:     pen needle, diabetic 32 gauge x 5/32" Ndle, Use as directed to administer insulin pen up to five times daily., Disp: , Rfl:     PNV,calcium 72-iron-folic acid (PRENATAL VITAMIN PLUS LOW IRON) 27 mg iron- 1 mg Tab, Take 1 tablet (1 each total) by mouth once daily., Disp: 30 tablet, Rfl: 11    Review of Systems   Constitutional:  Negative for chills, diaphoresis, fever and malaise/fatigue.   Respiratory:  Positive for shortness of breath. Negative for cough.         Shortness of breath with very heavy exertion   Cardiovascular:  Negative for chest pain, palpitations, orthopnea, claudication, leg swelling and PND.   Gastrointestinal:  Negative for abdominal pain, heartburn, nausea and vomiting.   Neurological:  Negative for dizziness.       Objective:   BP (!) 130/100 (BP Location: Left arm, Patient Position: Sitting, BP Method: Medium (Manual))   Pulse 78   Ht 5' 5" (1.651 m)   Wt 103.9 kg (229 lb)   LMP 05/27/2024   SpO2 96%   BMI 38.11 kg/m²     Physical Exam  Constitutional:       General: She is not in acute distress.     Appearance: Normal appearance.   Cardiovascular:      Rate and Rhythm: Normal rate and regular rhythm.      Pulses: Normal pulses.      Heart sounds: Normal heart sounds. No murmur heard.     No friction rub. No gallop.   Pulmonary:      Effort: Pulmonary effort is normal.      Breath sounds: Normal breath sounds. No wheezing or rales.   Musculoskeletal:      Right lower leg: No edema.      Left lower leg: No edema.   Skin:     General: Skin is warm and dry.   Neurological:      Mental Status: She is alert.           Assessment:     1. Dyspnea on exertion  Echo      2. Type 2 diabetes mellitus without complication, unspecified whether long term insulin use  EKG 12-lead    Echo    EKG " 12-lead      3. Pregnancy with type 2 diabetes mellitus in first trimester  Ambulatory referral/consult to Cardiology    Echo      4. Elevated blood pressure reading in office without diagnosis of hypertension              Plan:   No problem-specific Assessment & Plan notes found for this encounter.    Dyspnea on exertion  - as above dyspnea with very heavy exertion  - baseline EKG with poor R-wave progression and low voltage QRS in precordial leads  - schedule echo to evaluate for structural heart disease    Elevated blood pressure reading in office with a diagnosis of hypertension   - Patient noticed that her blood pressure is always within normal limits and she had some stress at work and she attributes her elevated blood pressure reading to her stress.    - She notes she has an MFM appointment next week and she was instructed to call our office if her blood pressure is elevated    Follow up in 3 months

## 2024-09-17 LAB
OHS QRS DURATION: 82 MS
OHS QTC CALCULATION: 424 MS

## 2024-10-02 ENCOUNTER — HOSPITAL ENCOUNTER (OUTPATIENT)
Dept: CARDIOLOGY | Facility: HOSPITAL | Age: 39
Discharge: HOME | End: 2024-10-02
Attending: STUDENT IN AN ORGANIZED HEALTH CARE EDUCATION/TRAINING PROGRAM
Payer: COMMERCIAL

## 2024-10-02 VITALS — WEIGHT: 229 LBS | BODY MASS INDEX: 38.15 KG/M2 | HEIGHT: 65 IN

## 2024-10-02 DIAGNOSIS — R06.09 DYSPNEA ON EXERTION: ICD-10-CM

## 2024-10-02 DIAGNOSIS — O24.111 PREGNANCY WITH TYPE 2 DIABETES MELLITUS IN FIRST TRIMESTER: ICD-10-CM

## 2024-10-02 DIAGNOSIS — E11.9 TYPE 2 DIABETES MELLITUS WITHOUT COMPLICATION, UNSPECIFIED WHETHER LONG TERM INSULIN USE: ICD-10-CM

## 2024-10-02 LAB
AORTIC ROOT ANNULUS: 2.16 CM
AORTIC VALVE CUSP SEPERATION: 1.67 CM
AV INDEX (PROSTH): 0.76
AV MEAN GRADIENT: 6 MMHG
AV PEAK GRADIENT: 10.2 MMHG
AV VALVE AREA BY VELOCITY RATIO: 2.7 CM²
AV VALVE AREA: 2.4 CM²
AV VELOCITY RATIO: 0.88
BSA FOR ECHO PROCEDURE: 2.18 M2
CV ECHO LV RWT: 0.31 CM
DOP CALC AO PEAK VEL: 1.6 M/S
DOP CALC AO VTI: 36.2 CM
DOP CALC LVOT AREA: 3.1 CM2
DOP CALC LVOT DIAMETER: 2 CM
DOP CALC LVOT PEAK VEL: 1.4 M/S
DOP CALC LVOT STROKE VOLUME: 86.7 CM3
DOP CALCLVOT PEAK VEL VTI: 27.6 CM
E WAVE DECELERATION TIME: 227.05 MSEC
E/A RATIO: 1.18
E/E' RATIO: 6.32 M/S
ECHO LV POSTERIOR WALL: 0.8 CM (ref 0.6–1.1)
FRACTIONAL SHORTENING: 52.9 % (ref 28–44)
INTERVENTRICULAR SEPTUM: 0.8 CM (ref 0.6–1.1)
LEFT ATRIUM AREA SYSTOLIC (APICAL 2 CHAMBER): 16.27 CM2
LEFT ATRIUM AREA SYSTOLIC (APICAL 4 CHAMBER): 18.7 CM2
LEFT ATRIUM VOLUME INDEX MOD: 21.5 ML/M2
LEFT ATRIUM VOLUME MOD: 45.24 ML
LEFT INTERNAL DIMENSION IN SYSTOLE: 2.4 CM (ref 2.1–4)
LEFT VENTRICLE DIASTOLIC VOLUME INDEX: 64.17 ML/M2
LEFT VENTRICLE DIASTOLIC VOLUME: 134.75 ML
LEFT VENTRICLE END SYSTOLIC VOLUME APICAL 2 CHAMBER: 39.19 ML
LEFT VENTRICLE END SYSTOLIC VOLUME APICAL 4 CHAMBER: 50.39 ML
LEFT VENTRICLE MASS INDEX: 66.9 G/M2
LEFT VENTRICLE SYSTOLIC VOLUME INDEX: 9.5 ML/M2
LEFT VENTRICLE SYSTOLIC VOLUME: 20.01 ML
LEFT VENTRICULAR INTERNAL DIMENSION IN DIASTOLE: 5.1 CM (ref 3.5–6)
LEFT VENTRICULAR MASS: 140.5 G
LV LATERAL E/E' RATIO: 4.94 M/S
LV SEPTAL E/E' RATIO: 8.78 M/S
LVED V (TEICH): 134.75 ML
LVES V (TEICH): 20.01 ML
LVOT MG: 3.96 MMHG
LVOT MV: 0.92 CM/S
MV PEAK A VEL: 0.67 M/S
MV PEAK E VEL: 0.79 M/S
MV STENOSIS PRESSURE HALF TIME: 65.84 MS
MV VALVE AREA P 1/2 METHOD: 3.34 CM2
OHS CV RV/LV RATIO: 0.61 CM
OHS LV EJECTION FRACTION SIMPSONS BIPLANE MOD: 64 %
PISA TR MAX VEL: 2.28 M/S
PV PEAK GRADIENT: 7 MMHG
PV PEAK VELOCITY: 1.31 M/S
RA VOL SYS: 25.88 ML
RIGHT ATRIAL AREA: 12.3 CM2
RIGHT ATRIUM VOLUME AREA LENGTH APICAL 4 CHAMBER: 24.17 ML
RIGHT VENTRICLE DIASTOLIC BASEL DIMENSION: 3.1 CM
RIGHT VENTRICLE DIASTOLIC LENGTH: 6.5 CM
RIGHT VENTRICLE DIASTOLIC MID DIMENSION: 2.3 CM
RIGHT VENTRICULAR LENGTH IN DIASTOLE (APICAL 4-CHAMBER VIEW): 6.53 CM
RV MID DIAMA: 2.27 CM
TDI LATERAL: 0.16 M/S
TDI SEPTAL: 0.09 M/S
TDI: 0.13 M/S
TR MAX PG: 21 MMHG
TRICUSPID ANNULAR PLANE SYSTOLIC EXCURSION: 2.99 CM
Z-SCORE OF LEFT VENTRICULAR DIMENSION IN END DIASTOLE: -2.47
Z-SCORE OF LEFT VENTRICULAR DIMENSION IN END SYSTOLE: -4.02

## 2024-10-02 PROCEDURE — 93306 TTE W/DOPPLER COMPLETE: CPT | Mod: 26,,, | Performed by: STUDENT IN AN ORGANIZED HEALTH CARE EDUCATION/TRAINING PROGRAM

## 2024-10-02 PROCEDURE — 93306 TTE W/DOPPLER COMPLETE: CPT

## 2024-10-08 ENCOUNTER — ROUTINE PRENATAL (OUTPATIENT)
Dept: OBSTETRICS AND GYNECOLOGY | Facility: CLINIC | Age: 39
End: 2024-10-08
Payer: COMMERCIAL

## 2024-10-08 VITALS
WEIGHT: 232.38 LBS | SYSTOLIC BLOOD PRESSURE: 135 MMHG | HEART RATE: 81 BPM | BODY MASS INDEX: 38.67 KG/M2 | DIASTOLIC BLOOD PRESSURE: 72 MMHG

## 2024-10-08 DIAGNOSIS — Z3A.19 19 WEEKS GESTATION OF PREGNANCY: Primary | ICD-10-CM

## 2024-10-08 DIAGNOSIS — O09.522 MULTIGRAVIDA OF ADVANCED MATERNAL AGE IN SECOND TRIMESTER: ICD-10-CM

## 2024-10-08 DIAGNOSIS — O24.112 PREGNANCY WITH TYPE 2 DIABETES MELLITUS IN SECOND TRIMESTER: ICD-10-CM

## 2024-10-08 DIAGNOSIS — O99.212 OTHER OBESITY AFFECTING PREGNANCY IN SECOND TRIMESTER: ICD-10-CM

## 2024-10-08 DIAGNOSIS — E66.89 OTHER OBESITY AFFECTING PREGNANCY IN SECOND TRIMESTER: ICD-10-CM

## 2024-10-08 PROCEDURE — 99999 PR PBB SHADOW E&M-EST. PATIENT-LVL III: CPT | Mod: PBBFAC,,, | Performed by: OBSTETRICS & GYNECOLOGY

## 2024-10-08 NOTE — PROGRESS NOTES
Return OB Visit    39 y.o. female  at 19w1d   She c/o nothing. She has been seeing high risk and they increased her insulin at night due to BGL running high. She is seeing them again on 10/28 and will have the anatomy scan at that time.   She brings her BG values with her today. She had some elevated fastings, but after MFM adjusted her insulin her fastings have now been normal.   She is getting a detailed US at her next appointment.   Overall she is feeling well and has no complaints.  Reports good fetal movement or fluttering. Denies any vaginal bleeding, leakage of fluid, cramping, contractions, or pressure.   Total weight gain/weight loss in pregnancy: 6.078 kg (13 lb 6.4 oz)     Vitals  BP: 135/72  Pulse: 81  Weight: 105.4 kg (232 lb 6.4 oz)  Prenatal  Fetal Heart Rate: 143  Movement: Present  Edema  LLE Edema: None  RLE Edema: None  Facial: None  Additional Edema?: No    Prenatal Labs:  Lab Results   Component Value Date    GROUPTRH A POS 2024    HGB 14.4 2024    HCT 43.1 2024     2024    SICKLE Negative 2024    HEPBSAG Non-Reactive 2024    [id number] Non-Reactive 2024    LABNGO Negative 2024    LABURIN Skin/Urogenital Abril Isolated, no further workup. 2024       A: 19w1d           ICD-10-CM ICD-9-CM    1. 19 weeks gestation of pregnancy  Z3A.19 V22.2 POCT URINALYSIS W/O SCOPE      2. Pregnancy with type 2 diabetes mellitus in second trimester  O24.112 648.03      250.00       3. Other obesity affecting pregnancy in second trimester  O99.212 649.13     E66.89 278.00       4. Multigravida of advanced maternal age in second trimester  O09.522 659.63           No pregnancy checklist tasks were completed during this visit, and no tasks are pending completion.    -Benefits of breastfeeding   -Rooming In and Skin to Skin    P: Bleeding, daily fetal kick counts, and  labor/labor precautions discussed.    Questions answered to desired level of  satisfaction  Verbalized understanding to all information and instructions provided.  Follow up in about 4 weeks (around 11/5/2024) for JOJO.  F/u with MFM as scheduled.    Eugenia Smith MD

## 2024-10-10 ENCOUNTER — PATIENT MESSAGE (OUTPATIENT)
Dept: OBSTETRICS AND GYNECOLOGY | Facility: CLINIC | Age: 39
End: 2024-10-10
Payer: COMMERCIAL

## 2024-11-04 ENCOUNTER — PATIENT MESSAGE (OUTPATIENT)
Dept: OBSTETRICS AND GYNECOLOGY | Facility: CLINIC | Age: 39
End: 2024-11-04
Payer: COMMERCIAL